# Patient Record
Sex: FEMALE | Race: BLACK OR AFRICAN AMERICAN | NOT HISPANIC OR LATINO | URBAN - METROPOLITAN AREA
[De-identification: names, ages, dates, MRNs, and addresses within clinical notes are randomized per-mention and may not be internally consistent; named-entity substitution may affect disease eponyms.]

---

## 2023-05-06 ENCOUNTER — INPATIENT (INPATIENT)
Facility: HOSPITAL | Age: 58
LOS: 0 days | Discharge: HOME | DRG: 179 | End: 2023-05-07
Attending: INTERNAL MEDICINE | Admitting: INTERNAL MEDICINE
Payer: COMMERCIAL

## 2023-05-06 VITALS
DIASTOLIC BLOOD PRESSURE: 77 MMHG | SYSTOLIC BLOOD PRESSURE: 128 MMHG | TEMPERATURE: 99 F | OXYGEN SATURATION: 98 % | RESPIRATION RATE: 16 BRPM | HEART RATE: 88 BPM

## 2023-05-06 DIAGNOSIS — I10 ESSENTIAL (PRIMARY) HYPERTENSION: ICD-10-CM

## 2023-05-06 DIAGNOSIS — R55 SYNCOPE AND COLLAPSE: ICD-10-CM

## 2023-05-06 DIAGNOSIS — Z98.891 HISTORY OF UTERINE SCAR FROM PREVIOUS SURGERY: Chronic | ICD-10-CM

## 2023-05-06 DIAGNOSIS — E03.9 HYPOTHYROIDISM, UNSPECIFIED: ICD-10-CM

## 2023-05-06 DIAGNOSIS — Z90.711 ACQUIRED ABSENCE OF UTERUS WITH REMAINING CERVICAL STUMP: Chronic | ICD-10-CM

## 2023-05-06 DIAGNOSIS — U07.1 COVID-19: ICD-10-CM

## 2023-05-06 DIAGNOSIS — E78.5 HYPERLIPIDEMIA, UNSPECIFIED: ICD-10-CM

## 2023-05-06 LAB
A1C WITH ESTIMATED AVERAGE GLUCOSE RESULT: 5.8 % — HIGH (ref 4–5.6)
ALBUMIN SERPL ELPH-MCNC: 3.6 G/DL — SIGNIFICANT CHANGE UP (ref 3.4–5)
ALP SERPL-CCNC: 70 U/L — SIGNIFICANT CHANGE UP (ref 40–120)
ALT FLD-CCNC: 25 U/L — SIGNIFICANT CHANGE UP (ref 12–42)
ANION GAP SERPL CALC-SCNC: 8 MMOL/L — LOW (ref 9–16)
APTT BLD: 27.4 SEC — LOW (ref 27.5–35.5)
AST SERPL-CCNC: 31 U/L — SIGNIFICANT CHANGE UP (ref 15–37)
BASOPHILS # BLD AUTO: 0.01 K/UL — SIGNIFICANT CHANGE UP (ref 0–0.2)
BASOPHILS NFR BLD AUTO: 0.1 % — SIGNIFICANT CHANGE UP (ref 0–2)
BILIRUB SERPL-MCNC: 0.4 MG/DL — SIGNIFICANT CHANGE UP (ref 0.2–1.2)
BUN SERPL-MCNC: 9 MG/DL — SIGNIFICANT CHANGE UP (ref 7–23)
CALCIUM SERPL-MCNC: 8.6 MG/DL — SIGNIFICANT CHANGE UP (ref 8.5–10.5)
CHLORIDE SERPL-SCNC: 105 MMOL/L — SIGNIFICANT CHANGE UP (ref 96–108)
CHOLEST SERPL-MCNC: 170 MG/DL — SIGNIFICANT CHANGE UP
CK MB CFR SERPL CALC: 2 NG/ML — SIGNIFICANT CHANGE UP (ref 0–6.7)
CK SERPL-CCNC: 297 U/L — HIGH (ref 25–170)
CO2 SERPL-SCNC: 26 MMOL/L — SIGNIFICANT CHANGE UP (ref 22–31)
CREAT SERPL-MCNC: 0.78 MG/DL — SIGNIFICANT CHANGE UP (ref 0.5–1.3)
D DIMER BLD IA.RAPID-MCNC: 608 NG/ML DDU — HIGH
EGFR: 90 ML/MIN/1.73M2 — SIGNIFICANT CHANGE UP
EOSINOPHIL # BLD AUTO: 0.02 K/UL — SIGNIFICANT CHANGE UP (ref 0–0.5)
EOSINOPHIL NFR BLD AUTO: 0.3 % — SIGNIFICANT CHANGE UP (ref 0–6)
ESTIMATED AVERAGE GLUCOSE: 120 MG/DL — HIGH (ref 68–114)
FLUAV AG NPH QL: SIGNIFICANT CHANGE UP
FLUBV AG NPH QL: SIGNIFICANT CHANGE UP
GLUCOSE SERPL-MCNC: 121 MG/DL — HIGH (ref 70–99)
HCT VFR BLD CALC: 35 % — SIGNIFICANT CHANGE UP (ref 34.5–45)
HDLC SERPL-MCNC: 53 MG/DL — SIGNIFICANT CHANGE UP
HGB BLD-MCNC: 11.5 G/DL — SIGNIFICANT CHANGE UP (ref 11.5–15.5)
IMM GRANULOCYTES NFR BLD AUTO: 0.3 % — SIGNIFICANT CHANGE UP (ref 0–0.9)
INR BLD: 1.14 — SIGNIFICANT CHANGE UP (ref 0.88–1.16)
LIDOCAIN IGE QN: 188 U/L — SIGNIFICANT CHANGE UP (ref 73–393)
LIPID PNL WITH DIRECT LDL SERPL: 100 MG/DL — HIGH
LYMPHOCYTES # BLD AUTO: 1.08 K/UL — SIGNIFICANT CHANGE UP (ref 1–3.3)
LYMPHOCYTES # BLD AUTO: 16.1 % — SIGNIFICANT CHANGE UP (ref 13–44)
MCHC RBC-ENTMCNC: 30.7 PG — SIGNIFICANT CHANGE UP (ref 27–34)
MCHC RBC-ENTMCNC: 32.9 GM/DL — SIGNIFICANT CHANGE UP (ref 32–36)
MCV RBC AUTO: 93.6 FL — SIGNIFICANT CHANGE UP (ref 80–100)
MONOCYTES # BLD AUTO: 0.65 K/UL — SIGNIFICANT CHANGE UP (ref 0–0.9)
MONOCYTES NFR BLD AUTO: 9.7 % — SIGNIFICANT CHANGE UP (ref 2–14)
NEUTROPHILS # BLD AUTO: 4.93 K/UL — SIGNIFICANT CHANGE UP (ref 1.8–7.4)
NEUTROPHILS NFR BLD AUTO: 73.5 % — SIGNIFICANT CHANGE UP (ref 43–77)
NON HDL CHOLESTEROL: 117 MG/DL — SIGNIFICANT CHANGE UP
NRBC # BLD: 0 /100 WBCS — SIGNIFICANT CHANGE UP (ref 0–0)
NT-PROBNP SERPL-SCNC: 34 PG/ML — SIGNIFICANT CHANGE UP
PLATELET # BLD AUTO: 180 K/UL — SIGNIFICANT CHANGE UP (ref 150–400)
POTASSIUM SERPL-MCNC: 3.5 MMOL/L — SIGNIFICANT CHANGE UP (ref 3.5–5.3)
POTASSIUM SERPL-SCNC: 3.5 MMOL/L — SIGNIFICANT CHANGE UP (ref 3.5–5.3)
PROT SERPL-MCNC: 7.6 G/DL — SIGNIFICANT CHANGE UP (ref 6.4–8.2)
PROTHROM AB SERPL-ACNC: 13.3 SEC — SIGNIFICANT CHANGE UP (ref 10.5–13.4)
RBC # BLD: 3.74 M/UL — LOW (ref 3.8–5.2)
RBC # FLD: 13.3 % — SIGNIFICANT CHANGE UP (ref 10.3–14.5)
RSV RNA NPH QL NAA+NON-PROBE: SIGNIFICANT CHANGE UP
SARS-COV-2 RNA SPEC QL NAA+PROBE: DETECTED
SODIUM SERPL-SCNC: 139 MMOL/L — SIGNIFICANT CHANGE UP (ref 132–145)
TRIGL SERPL-MCNC: 86 MG/DL — SIGNIFICANT CHANGE UP
TROPONIN I, HIGH SENSITIVITY RESULT: 114.5 NG/L — HIGH
TROPONIN I, HIGH SENSITIVITY RESULT: 123.8 NG/L — HIGH
TROPONIN T SERPL-MCNC: 0.01 NG/ML — SIGNIFICANT CHANGE UP (ref 0–0.01)
WBC # BLD: 6.71 K/UL — SIGNIFICANT CHANGE UP (ref 3.8–10.5)
WBC # FLD AUTO: 6.71 K/UL — SIGNIFICANT CHANGE UP (ref 3.8–10.5)

## 2023-05-06 PROCEDURE — 99285 EMERGENCY DEPT VISIT HI MDM: CPT

## 2023-05-06 PROCEDURE — 71275 CT ANGIOGRAPHY CHEST: CPT | Mod: 26

## 2023-05-06 RX ORDER — METOPROLOL TARTRATE 50 MG
25 TABLET ORAL DAILY
Refills: 0 | Status: DISCONTINUED | OUTPATIENT
Start: 2023-05-06 | End: 2023-05-07

## 2023-05-06 RX ORDER — ATORVASTATIN CALCIUM 80 MG/1
20 TABLET, FILM COATED ORAL AT BEDTIME
Refills: 0 | Status: DISCONTINUED | OUTPATIENT
Start: 2023-05-06 | End: 2023-05-07

## 2023-05-06 RX ORDER — LEVOTHYROXINE SODIUM 125 MCG
50 TABLET ORAL DAILY
Refills: 0 | Status: DISCONTINUED | OUTPATIENT
Start: 2023-05-06 | End: 2023-05-07

## 2023-05-06 RX ORDER — ENOXAPARIN SODIUM 100 MG/ML
40 INJECTION SUBCUTANEOUS EVERY 12 HOURS
Refills: 0 | Status: DISCONTINUED | OUTPATIENT
Start: 2023-05-06 | End: 2023-05-07

## 2023-05-06 RX ORDER — ASPIRIN/CALCIUM CARB/MAGNESIUM 324 MG
325 TABLET ORAL ONCE
Refills: 0 | Status: COMPLETED | OUTPATIENT
Start: 2023-05-06 | End: 2023-05-06

## 2023-05-06 RX ORDER — METOPROLOL TARTRATE 50 MG
1 TABLET ORAL
Refills: 0 | DISCHARGE

## 2023-05-06 RX ORDER — ATORVASTATIN CALCIUM 80 MG/1
1 TABLET, FILM COATED ORAL
Refills: 0 | DISCHARGE

## 2023-05-06 RX ORDER — LEVOTHYROXINE SODIUM 125 MCG
1 TABLET ORAL
Refills: 0 | DISCHARGE

## 2023-05-06 RX ORDER — ACETAMINOPHEN 500 MG
650 TABLET ORAL EVERY 6 HOURS
Refills: 0 | Status: DISCONTINUED | OUTPATIENT
Start: 2023-05-06 | End: 2023-05-07

## 2023-05-06 RX ADMIN — Medication 650 MILLIGRAM(S): at 23:51

## 2023-05-06 RX ADMIN — Medication 650 MILLIGRAM(S): at 22:51

## 2023-05-06 RX ADMIN — ENOXAPARIN SODIUM 40 MILLIGRAM(S): 100 INJECTION SUBCUTANEOUS at 21:16

## 2023-05-06 RX ADMIN — Medication 325 MILLIGRAM(S): at 09:44

## 2023-05-06 NOTE — H&P ADULT - PROBLEM SELECTOR PLAN 2
Pt reports feeling ill for the past 3 days with chills, URI symptoms, chills, sweats, HA and fast heart beat. COVID PCR positive on 5/6/23.   - D-dimer 608, F/u COVID biomarkers  - CTA Chest 5/6: clear lungs, neg for PE   - Supportive care Pt reports feeling ill for the past 3 days with chills, URI symptoms, chills, sweats, HA and fast heart beat. COVID PCR positive on 5/6/23.   - D-dimer 608, Ck 297, F/u COVID biomarkers  - CTA Chest 5/6: clear lungs, neg for PE   - Supportive care

## 2023-05-06 NOTE — H&P ADULT - PROBLEM SELECTOR PLAN 5
F/u TFT's  - Continue Levothyroxine 50mcg PO QD          DVT PPX: None, low risk   Dispo: Pending clinical progression

## 2023-05-06 NOTE — ED PROVIDER NOTE - OBJECTIVE STATEMENT
57 y.o female hx of htn, hld , hypothyroid now here with syncopal episode while checking out of her hotel today. Patient states that she was at the hotel desk and she became diaphoretic and passed out. patient states someone from the hotel caught her and helped her into a chair. Patient quickly returned to baseline. Patient denies chest pain,nausea,vomiting,diarrhea,fevers,chills,sob,trauma,loc. Patient appears well NAD

## 2023-05-06 NOTE — ED PROVIDER NOTE - NS ED ATTENDING STATEMENT MOD
This was a shared visit with the KORTNEY. I reviewed and verified the documentation and independently performed the documented:

## 2023-05-06 NOTE — ED PROVIDER NOTE - CLINICAL SUMMARY MEDICAL DECISION MAKING FREE TEXT BOX
Patient heres s/p syncopal episode at [facility]. traveling from [place].   exam unremarkable   Plan: EKG, labs, cxr  reassess

## 2023-05-06 NOTE — ED PROVIDER NOTE - ATTENDING APP SHARED VISIT CONTRIBUTION OF CARE
Case followed with PA.  Patient interviewed and examined independently.  Discussed results with patient, need for transfer for further monitoring.  Patient remained stable throughout visit.

## 2023-05-06 NOTE — ED ADULT TRIAGE NOTE - CHIEF COMPLAINT QUOTE
Pt BIBA c/o syncope. States was waiting on line to check out of hotel when she felt warm and diaphoretic. Denies head inj. FS on scene 187.

## 2023-05-06 NOTE — H&P ADULT - NSICDXPASTSURGICALHX_GEN_ALL_CORE_FT
PAST SURGICAL HISTORY:  No significant past surgical history      PAST SURGICAL HISTORY:  H/O  section     S/P partial hysterectomy

## 2023-05-06 NOTE — H&P ADULT - ASSESSMENT
56 yo F visiting from [place] with PMHx of HTN, HLD, Hypothyroidism who presented to [facility] ED on 5/6/23 with reports positive URI symptoms and chills for the past 3 days and earlier today she suddenly became very diaphoretic, felt dizzy and passed out in a hotel lobby. Pt was found to be COVID positive with elevated hsTrop. CTA chest neg for PE. Pt was transferred to [facility] for syncope w/u in the setting of COVID infection.

## 2023-05-06 NOTE — ED PROVIDER NOTE - NSICDXNOFAMILYHX_GEN_ALL_ED
Pt presents with pain, decreased ROM, strength, endurance, and balance impacting ability to perform ADLs and functional mobility. <-- Click to add NO pertinent Family History

## 2023-05-06 NOTE — H&P ADULT - PROBLEM SELECTOR PLAN 1
Pt presented to [facility] after a syncopal episode in a hotel lobby earlier today. She felt suddenly diaphoretic and dizzy before passing out. Denies head trauma. Found to be COVID pos. Syncope likely in the setting of dehydration and COVID infection.   - ECG:   - ECHO ordered  - Orthostatics neg   - Telemetry monitoring Pt presented to [facility] after a syncopal episode in a hotel lobby earlier today. She felt suddenly diaphoretic and dizzy before passing out. Denies head trauma. Found to be COVID pos. Syncope likely in the setting of dehydration and COVID infection.   - ECG: NSR @ 74bpm, TWI in III, RBBB  - ECHO ordered  - Orthostatics neg   - Telemetry monitoring Pt presented to [facility] after a syncopal episode in a hotel lobby earlier today. She felt suddenly diaphoretic and dizzy before passing out. Denies head trauma. Found to be COVID pos. Syncope likely in the setting of dehydration and COVID infection.   - hsTrop I 114.5>123.8, Trop T neg, CKMB nl,   - ECG: NSR @ 74bpm, TWI in III, RBBB  - ECHO ordered  - Orthostatics neg   - Telemetry monitoring

## 2023-05-06 NOTE — PATIENT PROFILE ADULT - FALL HARM RISK - HARM RISK INTERVENTIONS

## 2023-05-06 NOTE — PATIENT PROFILE ADULT - FUNCTIONAL ASSESSMENT - BASIC MOBILITY 1.
[FreeTextEntry1] : Status post partial colectomy for stage I colon cancer. Patient progressing well. Stable weight. No fevers or chills. Increased bowel movements from Prior to surgery. No fevers or chills. No bowel pain. Normal appetite
4 = No assist / stand by assistance

## 2023-05-06 NOTE — H&P ADULT - HISTORY OF PRESENT ILLNESS
56 yo F with PMHx of HTN, HLD, Hypothyroidism who presented to [facility] ED on 5/6/23 with reports of syncopal episode. Pt reports she was checking out of her hotel earlier today when she suddenly became diaphoretic and passed out. Pt denies ____.   In the ED VS: T 99.1F, /77, HR 88bpm, RR 16, O2 sat 98% on RA. Orthostatics neg. Labs significant for D-dimer 608, hsTrop I 114.5>123.8. ECG:  CTA chest 5/6/23 neg for PE. Pt was transferred to [facility] for syncope w/u.  56 yo F visiting from [place] with PMHx of HTN, HLD, Hypothyroidism who presented to [facility] ED on 5/6/23 with reports of syncopal episode. Pt reports traveling here from [place] for work 5 days ago, had an neg COVID test at home prior to flying here. She began feeling ill 3 days ago with HA, cough, intermittent fast heart beat, nasal congestion, fatigue and chills. Pt reports that she was checking out of her hotel earlier today when she suddenly became very diaphoretic, felt dizzy and passed out. She was caught by someone and did not hit her head. Last cardiac w/u was 7 years ago, reportedly normal. Pt denies CP, SOB, PND/orthopnea, LE edema, abdominal pain, N/V/D. In the ED VS: T 99.1F, /77, HR 88bpm, RR 16, O2 sat 98% on RA. Orthostatics neg. Labs significant for D-dimer 608, hsTrop I 114.5>123.8, COVID PCR positive. CTA chest 5/6/23 neg for PE. Pt was transferred to [facility] for syncope in the setting of COVID infection.

## 2023-05-07 VITALS
OXYGEN SATURATION: 98 % | SYSTOLIC BLOOD PRESSURE: 150 MMHG | DIASTOLIC BLOOD PRESSURE: 90 MMHG | RESPIRATION RATE: 20 BRPM | HEART RATE: 94 BPM

## 2023-05-07 LAB
ANION GAP SERPL CALC-SCNC: 12 MMOL/L — SIGNIFICANT CHANGE UP (ref 5–17)
BASOPHILS # BLD AUTO: 0.01 K/UL — SIGNIFICANT CHANGE UP (ref 0–0.2)
BASOPHILS NFR BLD AUTO: 0.2 % — SIGNIFICANT CHANGE UP (ref 0–2)
BUN SERPL-MCNC: 6 MG/DL — LOW (ref 7–23)
CALCIUM SERPL-MCNC: 9 MG/DL — SIGNIFICANT CHANGE UP (ref 8.4–10.5)
CHLORIDE SERPL-SCNC: 104 MMOL/L — SIGNIFICANT CHANGE UP (ref 96–108)
CO2 SERPL-SCNC: 23 MMOL/L — SIGNIFICANT CHANGE UP (ref 22–31)
CREAT SERPL-MCNC: 0.7 MG/DL — SIGNIFICANT CHANGE UP (ref 0.5–1.3)
CRP SERPL-MCNC: 26.9 MG/L — HIGH (ref 0–4)
EGFR: 102 ML/MIN/1.73M2 — SIGNIFICANT CHANGE UP
EOSINOPHIL # BLD AUTO: 0.03 K/UL — SIGNIFICANT CHANGE UP (ref 0–0.5)
EOSINOPHIL NFR BLD AUTO: 0.6 % — SIGNIFICANT CHANGE UP (ref 0–6)
FERRITIN SERPL-MCNC: 182 NG/ML — HIGH (ref 15–150)
GLUCOSE SERPL-MCNC: 98 MG/DL — SIGNIFICANT CHANGE UP (ref 70–99)
HCT VFR BLD CALC: 35.9 % — SIGNIFICANT CHANGE UP (ref 34.5–45)
HGB BLD-MCNC: 11.7 G/DL — SIGNIFICANT CHANGE UP (ref 11.5–15.5)
IMM GRANULOCYTES NFR BLD AUTO: 0.2 % — SIGNIFICANT CHANGE UP (ref 0–0.9)
LDH SERPL L TO P-CCNC: SIGNIFICANT CHANGE UP (ref 50–242)
LYMPHOCYTES # BLD AUTO: 2.41 K/UL — SIGNIFICANT CHANGE UP (ref 1–3.3)
LYMPHOCYTES # BLD AUTO: 47.1 % — HIGH (ref 13–44)
MAGNESIUM SERPL-MCNC: 2.1 MG/DL — SIGNIFICANT CHANGE UP (ref 1.6–2.6)
MCHC RBC-ENTMCNC: 30.4 PG — SIGNIFICANT CHANGE UP (ref 27–34)
MCHC RBC-ENTMCNC: 32.6 GM/DL — SIGNIFICANT CHANGE UP (ref 32–36)
MCV RBC AUTO: 93.2 FL — SIGNIFICANT CHANGE UP (ref 80–100)
MONOCYTES # BLD AUTO: 0.43 K/UL — SIGNIFICANT CHANGE UP (ref 0–0.9)
MONOCYTES NFR BLD AUTO: 8.4 % — SIGNIFICANT CHANGE UP (ref 2–14)
NEUTROPHILS # BLD AUTO: 2.23 K/UL — SIGNIFICANT CHANGE UP (ref 1.8–7.4)
NEUTROPHILS NFR BLD AUTO: 43.5 % — SIGNIFICANT CHANGE UP (ref 43–77)
NRBC # BLD: 0 /100 WBCS — SIGNIFICANT CHANGE UP (ref 0–0)
PLATELET # BLD AUTO: 200 K/UL — SIGNIFICANT CHANGE UP (ref 150–400)
POTASSIUM SERPL-MCNC: 3.7 MMOL/L — SIGNIFICANT CHANGE UP (ref 3.5–5.3)
POTASSIUM SERPL-SCNC: 3.7 MMOL/L — SIGNIFICANT CHANGE UP (ref 3.5–5.3)
PROCALCITONIN SERPL-MCNC: 0.06 NG/ML — SIGNIFICANT CHANGE UP (ref 0.02–0.1)
RBC # BLD: 3.85 M/UL — SIGNIFICANT CHANGE UP (ref 3.8–5.2)
RBC # FLD: 13.4 % — SIGNIFICANT CHANGE UP (ref 10.3–14.5)
SODIUM SERPL-SCNC: 139 MMOL/L — SIGNIFICANT CHANGE UP (ref 135–145)
T3 SERPL-MCNC: 90 NG/DL — SIGNIFICANT CHANGE UP (ref 80–200)
T4 FREE SERPL-MCNC: 1.18 NG/DL — SIGNIFICANT CHANGE UP (ref 0.93–1.7)
T4 FREE SERPL-MCNC: 1.2 NG/DL — SIGNIFICANT CHANGE UP (ref 0.93–1.7)
TSH SERPL-MCNC: 3.5 UIU/ML — SIGNIFICANT CHANGE UP (ref 0.27–4.2)
TSH SERPL-MCNC: 3.52 UIU/ML — SIGNIFICANT CHANGE UP (ref 0.27–4.2)
WBC # BLD: 5.12 K/UL — SIGNIFICANT CHANGE UP (ref 3.8–10.5)
WBC # FLD AUTO: 5.12 K/UL — SIGNIFICANT CHANGE UP (ref 3.8–10.5)

## 2023-05-07 PROCEDURE — 85025 COMPLETE CBC W/AUTO DIFF WBC: CPT

## 2023-05-07 PROCEDURE — 87637 SARSCOV2&INF A&B&RSV AMP PRB: CPT

## 2023-05-07 PROCEDURE — 84439 ASSAY OF FREE THYROXINE: CPT

## 2023-05-07 PROCEDURE — 82550 ASSAY OF CK (CPK): CPT

## 2023-05-07 PROCEDURE — 82728 ASSAY OF FERRITIN: CPT

## 2023-05-07 PROCEDURE — 99285 EMERGENCY DEPT VISIT HI MDM: CPT | Mod: 25

## 2023-05-07 PROCEDURE — 71275 CT ANGIOGRAPHY CHEST: CPT

## 2023-05-07 PROCEDURE — 84145 PROCALCITONIN (PCT): CPT

## 2023-05-07 PROCEDURE — 82962 GLUCOSE BLOOD TEST: CPT

## 2023-05-07 PROCEDURE — 80053 COMPREHEN METABOLIC PANEL: CPT

## 2023-05-07 PROCEDURE — 83615 LACTATE (LD) (LDH) ENZYME: CPT

## 2023-05-07 PROCEDURE — 80061 LIPID PANEL: CPT

## 2023-05-07 PROCEDURE — 84443 ASSAY THYROID STIM HORMONE: CPT

## 2023-05-07 PROCEDURE — 83735 ASSAY OF MAGNESIUM: CPT

## 2023-05-07 PROCEDURE — 85379 FIBRIN DEGRADATION QUANT: CPT

## 2023-05-07 PROCEDURE — 82553 CREATINE MB FRACTION: CPT

## 2023-05-07 PROCEDURE — 83036 HEMOGLOBIN GLYCOSYLATED A1C: CPT

## 2023-05-07 PROCEDURE — 86140 C-REACTIVE PROTEIN: CPT

## 2023-05-07 PROCEDURE — 85730 THROMBOPLASTIN TIME PARTIAL: CPT

## 2023-05-07 PROCEDURE — 83690 ASSAY OF LIPASE: CPT

## 2023-05-07 PROCEDURE — 36415 COLL VENOUS BLD VENIPUNCTURE: CPT

## 2023-05-07 PROCEDURE — 99239 HOSP IP/OBS DSCHRG MGMT >30: CPT

## 2023-05-07 PROCEDURE — 80048 BASIC METABOLIC PNL TOTAL CA: CPT

## 2023-05-07 PROCEDURE — 85610 PROTHROMBIN TIME: CPT

## 2023-05-07 PROCEDURE — 83880 ASSAY OF NATRIURETIC PEPTIDE: CPT

## 2023-05-07 PROCEDURE — 84480 ASSAY TRIIODOTHYRONINE (T3): CPT

## 2023-05-07 PROCEDURE — 84484 ASSAY OF TROPONIN QUANT: CPT

## 2023-05-07 RX ORDER — POLYETHYLENE GLYCOL 3350 17 G/17G
17 POWDER, FOR SOLUTION ORAL ONCE
Refills: 0 | Status: COMPLETED | OUTPATIENT
Start: 2023-05-07 | End: 2023-05-07

## 2023-05-07 RX ORDER — POTASSIUM CHLORIDE 20 MEQ
30 PACKET (EA) ORAL ONCE
Refills: 0 | Status: COMPLETED | OUTPATIENT
Start: 2023-05-07 | End: 2023-05-07

## 2023-05-07 RX ADMIN — ENOXAPARIN SODIUM 40 MILLIGRAM(S): 100 INJECTION SUBCUTANEOUS at 10:39

## 2023-05-07 RX ADMIN — POLYETHYLENE GLYCOL 3350 17 GRAM(S): 17 POWDER, FOR SOLUTION ORAL at 13:37

## 2023-05-07 RX ADMIN — Medication 50 MICROGRAM(S): at 06:20

## 2023-05-07 RX ADMIN — Medication 25 MILLIGRAM(S): at 06:20

## 2023-05-07 RX ADMIN — Medication 30 MILLIEQUIVALENT(S): at 13:37

## 2023-05-07 NOTE — DISCHARGE NOTE NURSING/CASE MANAGEMENT/SOCIAL WORK - NSDCPEFALRISK_GEN_ALL_CORE
For information on Fall & Injury Prevention, visit: [email or web address] OR  [email or web address] OR  https://www.cdc.gov/steadi/patient.html
normal (ped)...

## 2023-05-07 NOTE — PROGRESS NOTE ADULT - PROBLEM SELECTOR PLAN 1
03-Jul-2019 10:07 Pt presented to [facility] after a syncopal episode in a hotel lobby earlier today. She felt suddenly diaphoretic and dizzy before passing out. Denies head trauma. Found to be COVID pos. Syncope likely in the setting of dehydration and COVID infection.   - hsTrop I 114.5>123.8, Trop T neg, CKMB nl,   - ECG: NSR @ 74bpm, TWI in III, RBBB  - ECHO ordered  - Orthostatics neg   - Telemetry monitoring

## 2023-05-07 NOTE — DISCHARGE NOTE PROVIDER - HOSPITAL COURSE
INCOMPLETE    58 yo F visiting from [place] with PMHx of HTN, HLD, Hypothyroidism who presented to [facility] ED on 5/6/23 with reports of syncopal episode. Pt reports traveling here from [place] for work 5 days ago, had an neg COVID test at home prior to flying here. She began feeling ill 3 days ago with HA, cough, intermittent fast heart beat, nasal congestion, fatigue and chills. Pt reports that she was checking out of her hotel earlier today when she suddenly became very diaphoretic, felt dizzy and passed out. She was caught by someone and did not hit her head. Last cardiac w/u was 7 years ago, reportedly normal. Pt denies CP, SOB, PND/orthopnea, LE edema, abdominal pain, N/V/D. In the ED VS: T 99.1F, /77, HR 88bpm, RR 16, O2 sat 98% on RA. Orthostatics neg. Labs significant for D-dimer 608, hsTrop I 114.5>123.8, COVID PCR positive. ECG NSR @ 74bpm, TWI in III, RBBB. CTA chest 5/6/23 neg for PE. Pt was transferred to [facility] for syncope in the setting of COVID infection.     Pt was admitted to [address] [place] [address] overnight for observation. Today pt was seen and examined at bedside, denies complaints of chest pain, dizziness, SOB, palpitations, pain, LE edema, fever, chills. No events on tele overnight, VSS, Trop T neg x 1, CKMB nl, CK and CRP elevated likely in the setting of COVID infection. Physical exam WNL. Pt was seen and examined by cardiology attending as well and is deemed stable for discharge per Dr. Walters. Pt is to follow up with cardiologist __ in 1-2 weeks for post discharge check-up. All medications needing refills were e-prescribed to pt’s preferred pharmacy.     56 yo F visiting from [place] with PMHx of HTN, HLD, Hypothyroidism who presented to [facility] ED on 5/6/23 with reports of syncopal episode. Pt reports traveling here from [place] for work 5 days ago, had an neg COVID test at home prior to flying here. She began feeling ill 3 days ago with HA, cough, intermittent fast heart beat, nasal congestion, fatigue and chills. Pt reports that she was checking out of her hotel earlier today when she suddenly became very diaphoretic, felt dizzy and passed out. She was caught by someone and did not hit her head. Last cardiac w/u was 7 years ago, reportedly normal. Pt denies CP, SOB, PND/orthopnea, LE edema, abdominal pain, N/V/D. In the ED VS: T 99.1F, /77, HR 88bpm, RR 16, O2 sat 98% on RA. Orthostatics neg. Labs significant for D-dimer 608, hsTrop I 114.5>123.8, COVID PCR positive. ECG NSR @ 74bpm, TWI in III, RBBB. CTA chest 5/6/23 neg for PE. Pt was transferred to [facility] for syncope in the setting of COVID infection.     Pt was admitted to [address] [place] [address] overnight for observation. Today pt was seen and examined at bedside, denies complaints of chest pain, dizziness, SOB, palpitations, pain, LE edema, fever, chills. No events on tele overnight, VSS, Trop T neg x 1, CKMB nl, CK and CRP elevated likely in the setting of COVID infection. Physical exam WNL. Pt was seen and examined by cardiology attending as well and is deemed stable for discharge per Dr. Walters. Pt is to follow up with her PCP in [place] in 1-2 weeks for post discharge check-up. All medications needing refills were e-prescribed to pt’s preferred pharmacy.     INCOMPLETE    56 yo F visiting from [place] with PMHx of HTN, HLD, Hypothyroidism who presented to [facility] ED on 5/6/23 with reports of syncopal episode. Pt reports traveling here from [place] for work 5 days ago, had an neg COVID test at home prior to flying here. She began feeling ill 3 days ago with HA, cough, intermittent fast heart beat, nasal congestion, fatigue and chills. Pt reports that she was checking out of her hotel earlier today when she suddenly became very diaphoretic, felt dizzy and passed out. She was caught by someone and did not hit her head. Last cardiac w/u was 7 years ago, reportedly normal. Pt denies CP, SOB, PND/orthopnea, LE edema, abdominal pain, N/V/D. In the ED VS: T 99.1F, /77, HR 88bpm, RR 16, O2 sat 98% on RA. Orthostatics neg. Labs significant for D-dimer 608, hsTrop I 114.5>123.8, COVID PCR positive. ECG NSR @ 74bpm, TWI in III, RBBB. CTA chest 5/6/23 neg for PE. Pt was transferred to [facility] for syncope in the setting of COVID infection.     Pt was admitted to [address] [place] [address] overnight for observation. Today pt was seen and examined at bedside, denies complaints of chest pain, dizziness, SOB, palpitations, pain, LE edema, fever, chills. No events on tele overnight, VSS, Trop T neg x 1, CKMB nl, CK and CRP elevated likely in the setting of COVID infection. Physical exam WNL. Pt was seen and examined by cardiology attending as well and is deemed stable for discharge per Dr. Walters. Pt is to follow up with her PCP in [place] in 1-2 weeks for post discharge check-up. All medications needing refills were e-prescribed to pt’s preferred pharmacy.     INCOMPLETE    58 yo F visiting from [place] with PMHx of HTN, HLD, Hypothyroidism who presented to [facility] ED on 5/6/23 with reports of syncopal episode. Pt reports traveling here from [place] for work 5 days ago, had an neg COVID test at home prior to flying here. She began feeling ill 3 days ago with HA, cough, intermittent fast heart beat, nasal congestion, fatigue and chills. Pt reports that she was checking out of her hotel earlier today when she suddenly became very diaphoretic, felt dizzy and passed out. She was caught by someone and did not hit her head. In the ED VS: T 99.1F, /77, HR 88bpm, RR 16, O2 sat 98% on RA. Orthostatics neg. Labs significant for D-dimer 608, hsTrop I 114.5>123.8, COVID PCR positive. ECG NSR @ 74bpm, TWI in III, RBBB. CTA chest 5/6/23 neg for PE. Pt was transferred to [facility] for syncope in the setting of COVID infection.     Pt was admitted to [address] [place] [address] overnight for observation. Today pt was seen and examined at bedside, denies complaints of chest pain, dizziness, SOB, palpitations, pain, LE edema, fever, chills. No events on tele overnight, VSS, Trop T neg x 1, CKMB nl, CK and CRP elevated likely in the setting of COVID infection. Physical exam WNL. Pt was seen and examined by cardiology attending as well and is deemed stable for discharge per Dr. Walters. Pt is to follow up with her PCP in [place] in 1-2 weeks for post discharge check-up.      56 yo F visiting from [place] with PMHx of HTN, HLD, Hypothyroidism who presented to [facility] ED on 5/6/23 with reports of syncopal episode. Pt reports traveling here from [place] for work 5 days ago, had an neg COVID test at home prior to flying here. She began feeling ill 3 days ago with HA, cough, intermittent fast heart beat, nasal congestion, fatigue and chills. Pt reports that she was checking out of her hotel earlier today when she suddenly became very diaphoretic, felt dizzy and passed out. She was caught by someone and did not hit her head. In the ED VS: T 99.1F, /77, HR 88bpm, RR 16, O2 sat 98% on RA. Orthostatics neg. Labs significant for D-dimer 608, hsTrop I 114.5>123.8, COVID PCR positive. ECG NSR @ 74bpm, TWI in III, RBBB. CTA chest 5/6/23 neg for PE. Pt was transferred to [facility] for syncope in the setting of COVID infection.     Pt was admitted to [address] [place] [address] overnight for observation. Today pt was seen and examined at bedside, denies complaints of chest pain, dizziness, SOB, palpitations, pain, LE edema, fever, chills. No events on tele overnight, VSS, Trop T neg x 1, CKMB nl, CK and CRP elevated likely in the setting of COVID infection. Physical exam WNL. Pt was seen and examined by cardiology attending as well and is deemed stable for discharge per Dr. Walters. Pt is to follow up with her PCP in [place] in 1-2 weeks for post discharge check-up.

## 2023-05-07 NOTE — PROGRESS NOTE ADULT - SUBJECTIVE AND OBJECTIVE BOX
S: Pt seen and examined bedside.  Patient denies C/P, SOB, N/V, dizziness, palpitations, and diaphoresis.  Pt denies fever/chills, dysuria, abdominal pain, diarrhea, and cough  12 Point ROS otherwise negative except as per HPI/subjective.     O: Vital Signs Last 24 Hrs  T(C): 37.2 (07 May 2023 13:43), Max: 38 (06 May 2023 21:44)  T(F): 99 (07 May 2023 13:43), Max: 100.4 (06 May 2023 21:44)  HR: 94 (07 May 2023 13:32) (78 - 94)  BP: 116/71 (07 May 2023 13:32) (114/74 - 126/78)  BP(mean): 89 (07 May 2023 06:19) (89 - 96)  RR: 20 (07 May 2023 13:32) (19 - 20)  SpO2: 98% (07 May 2023 13:32) (96% - 99%)    Parameters below as of 07 May 2023 13:32  Patient On (Oxygen Delivery Method): room air        PHYSICAL EXAM:  GEN: NAD  HEENT: No JVD  PULM:  CTA B/L  CARD:  RRR, S1 and S2   ABD: +BS, NT, soft/ND	  EXT: No Edema B/L LE  NEURO: A+Ox3, no focal deficit  PSYCH: Mood Appropriate    LABS:                        11.7   5.12  )-----------( 200      ( 07 May 2023 05:30 )             35.9         139  |  104  |  6<L>  ----------------------------<  98  3.7   |  23  |  0.70    Ca    9.0      07 May 2023 05:30  Mg     2.1         TPro  7.6  /  Alb  3.6  /  TBili  0.4  /  DBili  x   /  AST  31  /  ALT  25  /  AlkPhos  70      PT/INR - ( 06 May 2023 12:52 )   PT: 13.3 sec;   INR: 1.14          PTT - ( 06 May 2023 12:52 )  PTT:27.4 sec  Troponin T, Serum: 0.01 ng/mL (23 @ 16:35)       @ 07:01  -   @ 14:32  --------------------------------------------------------  IN: 360 mL / OUT: 0 mL / NET: 360 mL      Daily Height in cm: 175.26 (06 May 2023 15:20)    Daily Weight in k (06 May 2023 15:20)

## 2023-05-07 NOTE — DISCHARGE NOTE PROVIDER - CARE PROVIDER_API CALL
Christine Greenberg  [facility]. [place]  Phone: [phone]  Fax: (   )    -  Follow Up Time: 1 week

## 2023-05-07 NOTE — DISCHARGE NOTE PROVIDER - PROVIDER TOKENS
FREE:[LAST:[Mayet],FIRST:[Christine],PHONE:[phone]],FAX:[(   )    -],ADDRESS:[[facility]],FOLLOWUP:[1 week]]

## 2023-05-07 NOTE — DISCHARGE NOTE PROVIDER - NSDCMRMEDTOKEN_GEN_ALL_CORE_FT
levothyroxine 25 mcg (0.025 mg) oral tablet: 1 tablet with sensor orally once a day  Lipitor 20 mg oral tablet: 1 tab(s) orally once a day  Toprol-XL 25 mg oral tablet, extended release: 1 tab(s) orally once a day

## 2023-05-07 NOTE — PROGRESS NOTE ADULT - PROBLEM SELECTOR PLAN 2
Pt reports feeling ill for the past 3 days with chills, URI symptoms, chills, sweats, HA and fast heart beat. COVID PCR positive on 5/6/23.   - D-dimer 608, Ck 297, F/u COVID biomarkers  - CTA Chest 5/6: clear lungs, neg for PE   - Supportive care

## 2023-05-07 NOTE — DISCHARGE NOTE PROVIDER - NSDCCPCAREPLAN_GEN_ALL_CORE_FT
PRINCIPAL DISCHARGE DIAGNOSIS  Diagnosis: Syncope  Assessment and Plan of Treatment:       SECONDARY DISCHARGE DIAGNOSES  Diagnosis: 2019 novel coronavirus disease (COVID-19)  Assessment and Plan of Treatment: You tested positive for COVID-19 virus on 5/6/23. Your oxygen level remained stable and you did not require any additional oxygen in the hospital. You remained fever free. The treatment is continued supportive care, which means: rest, hydration, and maintaining a good healthy diet. You may take tylenol if you experience any fevers and Robitussin for cough. If you start experiencing extreme shortness of breath, difficulty breathing resulting in the inability to even walk, please visit an urgent care. Please maintain a total 5 day quarantine before travelling on an airplane. Wear a mask at all times should you have to be outdoors briefly - and avoid crowds, public spaces, and mass transit at all times during this quarantine. Continue to wash your hands frequently. Please see the supplemented written instructions for further use.      Diagnosis: Hypertension  Assessment and Plan of Treatment: You have a history of elevated blood pressure and you should continue your blood pressure medications as prescribed.      Diagnosis: Hyperlipidemia  Assessment and Plan of Treatment: Please continue your daily statin       PRINCIPAL DISCHARGE DIAGNOSIS  Diagnosis: Syncope  Assessment and Plan of Treatment: You presented to the hospital after an episode of passing out. This was likely in the setting of being dehydrated from having fevers due to your COVID infection. There were no arrhythmias (abnormal heart rhythms) on the heart monitor and your blood tests did not show any sign of heart muscle strain. Please follow up with your primary care physician.      SECONDARY DISCHARGE DIAGNOSES  Diagnosis: 2019 novel coronavirus disease (COVID-19)  Assessment and Plan of Treatment: You tested positive for COVID-19 virus on 5/6/23. Your oxygen level remained stable and you did not require any additional oxygen in the hospital. You remained fever free. The treatment is continued supportive care, which means: rest, hydration, and maintaining a good healthy diet. You may take tylenol if you experience any fevers and Robitussin for cough. If you start experiencing extreme shortness of breath, difficulty breathing resulting in the inability to even walk, please visit an urgent care. Please maintain a total 5 day quarantine before travelling on an airplane. Wear a mask at all times should you have to be outdoors briefly - and avoid crowds, public spaces, and mass transit at all times during this quarantine. Continue to wash your hands frequently. Please see the supplemented written instructions for further use.      Diagnosis: Hypertension  Assessment and Plan of Treatment: You have a history of elevated blood pressure and you should continue your blood pressure medications as prescribed.      Diagnosis: Hyperlipidemia  Assessment and Plan of Treatment: Please continue your daily statin and a diet low in saturated fats to maintain healthy cholesterol levels.

## 2023-05-07 NOTE — DISCHARGE NOTE PROVIDER - NSDCFUADDAPPT_GEN_ALL_CORE_FT
Please follow up with your primary care physician in [place].  Please follow up with your primary care physician

## 2023-05-07 NOTE — PROGRESS NOTE ADULT - ASSESSMENT
58 yo F visiting from [place] with PMHx of HTN, HLD, Hypothyroidism who presented to [facility] ED on 5/6/23 with reports positive URI symptoms and chills for the past 3 days and earlier today she suddenly became very diaphoretic, felt dizzy and passed out in a hotel lobby. Pt was found to be COVID positive with elevated hsTrop. CTA chest neg for PE. Pt was transferred to [facility] for syncope w/u in the setting of COVID infection.

## 2023-05-07 NOTE — DISCHARGE NOTE NURSING/CASE MANAGEMENT/SOCIAL WORK - HISTORY OF COVID-19 VACCINATION
Vaccine status unknown
77 Y/O F PMH DM II , hypothyroid c/o cough since Thursday worse with coughing/turning/pressing the area. Trop sent, pt febrile and tachycardic likely sepsis pneumonia. ABX ordered. BP stable at this time.

## 2023-05-07 NOTE — DISCHARGE NOTE NURSING/CASE MANAGEMENT/SOCIAL WORK - PATIENT PORTAL LINK FT
You can access the FollowMyHealth Patient Portal offered by [facility] by registering at the following website: [email or web address]. By joining [employer]’s FollowMyHealth portal, you will also be able to view your health information using other applications (apps) compatible with our system.

## 2023-05-15 DIAGNOSIS — U07.1 COVID-19: ICD-10-CM

## 2023-05-15 DIAGNOSIS — Z91.018 ALLERGY TO OTHER FOODS: ICD-10-CM

## 2023-05-15 DIAGNOSIS — I10 ESSENTIAL (PRIMARY) HYPERTENSION: ICD-10-CM

## 2023-05-15 DIAGNOSIS — Z88.0 ALLERGY STATUS TO PENICILLIN: ICD-10-CM

## 2023-05-15 DIAGNOSIS — E03.9 HYPOTHYROIDISM, UNSPECIFIED: ICD-10-CM

## 2023-05-15 DIAGNOSIS — E78.5 HYPERLIPIDEMIA, UNSPECIFIED: ICD-10-CM

## 2023-05-15 DIAGNOSIS — E86.0 DEHYDRATION: ICD-10-CM
